# Patient Record
Sex: FEMALE | Race: AMERICAN INDIAN OR ALASKA NATIVE
[De-identification: names, ages, dates, MRNs, and addresses within clinical notes are randomized per-mention and may not be internally consistent; named-entity substitution may affect disease eponyms.]

---

## 2018-07-30 ENCOUNTER — HOSPITAL ENCOUNTER (OUTPATIENT)
Dept: HOSPITAL 5 - OR | Age: 33
Discharge: HOME | End: 2018-07-30
Attending: PLASTIC SURGERY
Payer: COMMERCIAL

## 2018-07-30 VITALS — DIASTOLIC BLOOD PRESSURE: 68 MMHG | SYSTOLIC BLOOD PRESSURE: 125 MMHG

## 2018-07-30 DIAGNOSIS — Z79.899: ICD-10-CM

## 2018-07-30 DIAGNOSIS — N62: ICD-10-CM

## 2018-07-30 DIAGNOSIS — L30.4: ICD-10-CM

## 2018-07-30 DIAGNOSIS — Z98.890: ICD-10-CM

## 2018-07-30 DIAGNOSIS — N64.4: ICD-10-CM

## 2018-07-30 DIAGNOSIS — Z88.2: ICD-10-CM

## 2018-07-30 DIAGNOSIS — Z98.891: ICD-10-CM

## 2018-07-30 DIAGNOSIS — N60.11: Primary | ICD-10-CM

## 2018-07-30 PROCEDURE — 19318 BREAST REDUCTION: CPT

## 2018-07-30 PROCEDURE — 88305 TISSUE EXAM BY PATHOLOGIST: CPT

## 2018-07-30 PROCEDURE — 81025 URINE PREGNANCY TEST: CPT

## 2018-07-30 RX ADMIN — HYDROMORPHONE HYDROCHLORIDE PRN MG: 1 INJECTION, SOLUTION INTRAMUSCULAR; INTRAVENOUS; SUBCUTANEOUS at 13:05

## 2018-07-30 RX ADMIN — HYDROMORPHONE HYDROCHLORIDE PRN MG: 1 INJECTION, SOLUTION INTRAMUSCULAR; INTRAVENOUS; SUBCUTANEOUS at 13:28

## 2018-07-30 RX ADMIN — HYDROMORPHONE HYDROCHLORIDE PRN MG: 1 INJECTION, SOLUTION INTRAMUSCULAR; INTRAVENOUS; SUBCUTANEOUS at 12:56

## 2018-07-30 NOTE — ANESTHESIA CONSULTATION
Anesthesia Consult and Med Hx


Date of service: 07/30/18





- Airway


Anesthetic Teeth Evaluation: Good


ROM Head & Neck: Adequate


Mental/Hyoid Distance: Adequate


Mallampati Class: Class II


Intubation Access Assessment: Probably Good





- Pulmonary Exam


CTA: Yes





- Pre-Operative Health Status


ASA Pre-Surgery Classification: ASA1


Proposed Anesthetic Plan: General





- Pulmonary


Hx Smoking: No





- Cardiovascular System


Hx Hypertension: No





- Central Nervous System


Hx Neuromuscular Disorder: No


Hx Psychiatric Problems: No





- Other Systems


Hx Alcohol Use: Yes (OCCAS)


Hx Substance Use: No

## 2018-07-30 NOTE — DISCHARGE SUMMARY
Short Stay Discharge Plan


Activity: no restrictions


Weight Bearing Status: Full Weight Bearing


Diet: regular


Wound: remove dressing (72hrs)


Follow up with: 


PRIMARY CARE,MD [Primary Care Provider] - 6 Weeks


WORK,YANDEL BALL JR, MD [Staff Physician] - 7 Days

## 2018-07-30 NOTE — SHORT STAY SUMMARY
Short Stay Documentation


Date of service: 07/30/18





- Allergies and Medications


Current Medications: 


 Allergies





Sulfa (Sulfonamide Antibiotics) Allergy (Verified 07/27/18 14:48)


 Itching





 Home Medications











 Medication  Instructions  Recorded  Confirmed  Last Taken  Type


 


No Known Home Medications [No  07/27/18 07/27/18 Unknown History





Reported Home Medications]     








Active Medications





Acetaminophen (Tylenol)  650 mg PO ONCE PRN


   PRN Reason: Pain, Mild (1-3)


   Stop: 07/30/18 18:00


Famotidine (Pepcid)  20 mg IV PREOP NR


   Stop: 07/30/18 23:59


Hydromorphone HCl (Dilaudid)  0.5 mg IV Q10MIN PRN


   PRN Reason: Pain , Severe (7-10)


   Stop: 07/30/18 18:00


Lactated Ringer's (Lactated Ringers)  1,000 mls @ 42 mls/hr IV AS DIRECT FARRAH


Ketorolac Tromethamine (Toradol)  15 mg IV ONCE PRN


   PRN Reason: Pain, Mild (1-3)


   Stop: 07/30/18 18:00


Midazolam HCl (Versed)  2 mg IV PREOP NR


   Stop: 07/30/18 23:59


Ondansetron HCl (Zofran)  4 mg IV ONCE PRN


   PRN Reason: Nausea And Vomiting


   Stop: 07/30/18 18:00











- Brief post op/procedure progress note


Date of procedure: 07/30/18


Pre-op diagnosis: Macromastia


Post-op diagnosis: same


Procedure: 





Marco. Breast REduction


Anesthesia: GETA


Findings: 





RT Breast         LT Breast     


Surgeon: YANDEL WAITE JR


Estimated blood loss: 50-100ml


Specimen disposition: to lab


Condition: stable





- Disposition


Condition at discharge: Good


Disposition: DC-01 TO HOME OR SELFCARE





Short Stay Discharge Plan


Follow up with: 


YANDEL WAITE JR, MD [Staff Physician] - 7 Days


PRIMARY CARE,MD [Primary Care Provider] - 6 Weeks

## 2018-07-30 NOTE — OPERATIVE REPORT
SERVICE:  Plastic Surgery



PREOPERATIVE DIAGNOSIS:  Macromastia.



POSTOPERATIVE DIAGNOSIS:  Macromastia.



PROCEDURE:  Bilateral reduction mammoplasty.



SURGEON:  Aleksey Gerard MD



ASSISTANT:  Arnold Argueta CSA



FINDINGS:  800 gm removed from each breast.



DESCRIPTION OF PROCEDURE:  The patient was brought in to the operating room and

placed on the table in supine position.  Following administration of general

anesthesia, bilateral breasts were prepped with Betadine solution and draped in

the usual sterile manner.  A #10 blade scalpel was used to make a circumareolar

skin incision followed by de-epithelization of inferior dermal pedicle. 

Modified Wise pattern skin markings were incised with scalpel and deepened

through the subcutaneous fat and breast tissue using the electrocautery.  Skin

flaps were raised in standard manner as was fashioning of an inferior central

mound pedicle.  Breast tissue was resected and sent to pathology as specimen. 

Hemostasis was controlled using electrocautery.  Closure was performed over 10

mm CE drains using interrupted and running subcuticular 2-0 Monocryl sutures. 

Mastisol, Steri-Strips, and sterile dressings applied.  The patient will be

receiving her first of 3 sessions of radiation therapy to help prevent recurrent

keloid scarring.





DD: 07/30/2018 12:13

DT: 07/30/2018 13:27

JOB# [id number]  [id number]

FTW/NTS